# Patient Record
Sex: FEMALE | Race: ASIAN | NOT HISPANIC OR LATINO
[De-identification: names, ages, dates, MRNs, and addresses within clinical notes are randomized per-mention and may not be internally consistent; named-entity substitution may affect disease eponyms.]

---

## 2018-07-09 PROBLEM — Z00.00 ENCOUNTER FOR PREVENTIVE HEALTH EXAMINATION: Status: ACTIVE | Noted: 2018-07-09

## 2018-07-16 ENCOUNTER — APPOINTMENT (OUTPATIENT)
Dept: SURGERY | Facility: CLINIC | Age: 33
End: 2018-07-16
Payer: MEDICAID

## 2018-07-16 VITALS
SYSTOLIC BLOOD PRESSURE: 95 MMHG | HEART RATE: 74 BPM | DIASTOLIC BLOOD PRESSURE: 68 MMHG | OXYGEN SATURATION: 100 % | BODY MASS INDEX: 17.49 KG/M2 | WEIGHT: 105 LBS | TEMPERATURE: 98 F | HEIGHT: 65 IN

## 2018-07-16 PROCEDURE — 99203 OFFICE O/P NEW LOW 30 MIN: CPT

## 2018-08-08 ENCOUNTER — OUTPATIENT (OUTPATIENT)
Dept: OUTPATIENT SERVICES | Facility: HOSPITAL | Age: 33
LOS: 1 days | End: 2018-08-08
Payer: MEDICAID

## 2018-08-08 VITALS
WEIGHT: 106.04 LBS | HEART RATE: 72 BPM | HEIGHT: 65 IN | RESPIRATION RATE: 15 BRPM | SYSTOLIC BLOOD PRESSURE: 105 MMHG | TEMPERATURE: 98 F | OXYGEN SATURATION: 98 % | DIASTOLIC BLOOD PRESSURE: 65 MMHG

## 2018-08-08 DIAGNOSIS — R59.1 GENERALIZED ENLARGED LYMPH NODES: ICD-10-CM

## 2018-08-08 DIAGNOSIS — Z01.818 ENCOUNTER FOR OTHER PREPROCEDURAL EXAMINATION: ICD-10-CM

## 2018-08-08 LAB — HCG UR QL: NEGATIVE — SIGNIFICANT CHANGE UP

## 2018-08-08 PROCEDURE — 81025 URINE PREGNANCY TEST: CPT

## 2018-08-08 PROCEDURE — G0463: CPT

## 2018-08-08 NOTE — H&P PST ADULT - RS GEN PE MLT RESP DETAILS PC
breath sounds equal/clear to auscultation bilaterally/no rhonchi/no wheezes/airway patent/respirations non-labored/good air movement/no rales

## 2018-08-08 NOTE — H&P PST ADULT - NEGATIVE CARDIOVASCULAR SYMPTOMS
no chest pain/no palpitations/no dyspnea on exertion/no orthopnea/no peripheral edema/no paroxysmal nocturnal dyspnea/no claudication

## 2018-08-08 NOTE — H&P PST ADULT - NEGATIVE BREAST SYMPTOMS
no nipple discharge R/no breast tenderness L/no breast lump R/no breast tenderness R/no nipple discharge L

## 2018-08-08 NOTE — H&P PST ADULT - NEGATIVE GASTROINTESTINAL SYMPTOMS
no constipation/no change in bowel habits/no flatulence/no abdominal pain/no vomiting/no diarrhea/no nausea

## 2018-08-08 NOTE — H&P PST ADULT - GASTROINTESTINAL DETAILS
bowel sounds normal/soft/no distention/no bruit/no rebound tenderness/normal/nontender/no masses palpable/no guarding

## 2018-08-08 NOTE — H&P PST ADULT - HISTORY OF PRESENT ILLNESS
32 years old female presented with enlarged supraclavicular node on left side x 4 months, pt was diagnosed with generalized enlarged lymph nodes and is scheduled for excision of left supraclavicular lymph node biopsy on 8/10/18.

## 2018-08-08 NOTE — H&P PST ADULT - NEGATIVE GENERAL GENITOURINARY SYMPTOMS
no incontinence/no flank pain L/no urinary hesitancy/no renal colic/normal urinary frequency/no hematuria/no dysuria/no flank pain R/no urine discoloration

## 2018-08-08 NOTE — H&P PST ADULT - NSANTHOSAYNRD_GEN_A_CORE
No. SOFÍA screening performed.  STOP BANG Legend: 0-2 = LOW Risk; 3-4 = INTERMEDIATE Risk; 5-8 = HIGH Risk

## 2018-08-12 ENCOUNTER — TRANSCRIPTION ENCOUNTER (OUTPATIENT)
Age: 33
End: 2018-08-12

## 2018-08-13 ENCOUNTER — RESULT REVIEW (OUTPATIENT)
Age: 33
End: 2018-08-13

## 2018-08-13 ENCOUNTER — OUTPATIENT (OUTPATIENT)
Dept: OUTPATIENT SERVICES | Facility: HOSPITAL | Age: 33
LOS: 1 days | End: 2018-08-13
Payer: MEDICAID

## 2018-08-13 VITALS
DIASTOLIC BLOOD PRESSURE: 87 MMHG | TEMPERATURE: 97 F | HEART RATE: 94 BPM | WEIGHT: 106.04 LBS | HEIGHT: 65 IN | SYSTOLIC BLOOD PRESSURE: 114 MMHG | OXYGEN SATURATION: 99 % | RESPIRATION RATE: 14 BRPM

## 2018-08-13 VITALS
OXYGEN SATURATION: 100 % | TEMPERATURE: 98 F | SYSTOLIC BLOOD PRESSURE: 108 MMHG | RESPIRATION RATE: 18 BRPM | DIASTOLIC BLOOD PRESSURE: 69 MMHG | HEART RATE: 73 BPM

## 2018-08-13 DIAGNOSIS — R59.1 GENERALIZED ENLARGED LYMPH NODES: ICD-10-CM

## 2018-08-13 LAB
GRAM STN FLD: SIGNIFICANT CHANGE UP
SPECIMEN SOURCE: SIGNIFICANT CHANGE UP

## 2018-08-13 PROCEDURE — 87070 CULTURE OTHR SPECIMN AEROBIC: CPT

## 2018-08-13 PROCEDURE — 88341 IMHCHEM/IMCYTCHM EA ADD ANTB: CPT

## 2018-08-13 PROCEDURE — 88333 PATH CONSLTJ SURG CYTO XM 1: CPT

## 2018-08-13 PROCEDURE — 88342 IMHCHEM/IMCYTCHM 1ST ANTB: CPT

## 2018-08-13 PROCEDURE — 88365 INSITU HYBRIDIZATION (FISH): CPT | Mod: 26

## 2018-08-13 PROCEDURE — 88334 PATH CONSLTJ SURG CYTO XM EA: CPT | Mod: 26,59

## 2018-08-13 PROCEDURE — ZZZZZ: CPT

## 2018-08-13 PROCEDURE — 88331 PATH CONSLTJ SURG 1 BLK 1SPC: CPT

## 2018-08-13 PROCEDURE — 88184 FLOWCYTOMETRY/ TC 1 MARKER: CPT

## 2018-08-13 PROCEDURE — 88331 PATH CONSLTJ SURG 1 BLK 1SPC: CPT | Mod: 26

## 2018-08-13 PROCEDURE — 38510 BIOPSY/REMOVAL LYMPH NODES: CPT | Mod: LT

## 2018-08-13 PROCEDURE — 88365 INSITU HYBRIDIZATION (FISH): CPT

## 2018-08-13 PROCEDURE — 88342 IMHCHEM/IMCYTCHM 1ST ANTB: CPT | Mod: 26

## 2018-08-13 PROCEDURE — 88307 TISSUE EXAM BY PATHOLOGIST: CPT

## 2018-08-13 PROCEDURE — 87075 CULTR BACTERIA EXCEPT BLOOD: CPT

## 2018-08-13 PROCEDURE — 88185 FLOWCYTOMETRY/TC ADD-ON: CPT

## 2018-08-13 PROCEDURE — 88341 IMHCHEM/IMCYTCHM EA ADD ANTB: CPT | Mod: 26

## 2018-08-13 PROCEDURE — 88307 TISSUE EXAM BY PATHOLOGIST: CPT | Mod: 26

## 2018-08-13 RX ORDER — FENTANYL CITRATE 50 UG/ML
25 INJECTION INTRAVENOUS
Qty: 0 | Refills: 0 | Status: DISCONTINUED | OUTPATIENT
Start: 2018-08-13 | End: 2018-08-13

## 2018-08-13 RX ORDER — SODIUM CHLORIDE 9 MG/ML
1000 INJECTION, SOLUTION INTRAVENOUS
Qty: 0 | Refills: 0 | Status: DISCONTINUED | OUTPATIENT
Start: 2018-08-13 | End: 2018-08-13

## 2018-08-13 RX ORDER — KETOROLAC TROMETHAMINE 30 MG/ML
1 SYRINGE (ML) INJECTION
Qty: 20 | Refills: 0 | OUTPATIENT
Start: 2018-08-13 | End: 2018-08-17

## 2018-08-13 RX ORDER — SODIUM CHLORIDE 9 MG/ML
3 INJECTION INTRAMUSCULAR; INTRAVENOUS; SUBCUTANEOUS EVERY 8 HOURS
Qty: 0 | Refills: 0 | Status: DISCONTINUED | OUTPATIENT
Start: 2018-08-13 | End: 2018-08-13

## 2018-08-13 RX ORDER — IBUPROFEN 200 MG
600 TABLET ORAL EVERY 6 HOURS
Qty: 0 | Refills: 0 | Status: DISCONTINUED | OUTPATIENT
Start: 2018-08-13 | End: 2018-08-21

## 2018-08-13 NOTE — BRIEF OPERATIVE NOTE - PROCEDURE
<<-----Click on this checkbox to enter Procedure Excision of cervical lymph node of left side of neck  08/13/2018    Active  MAYU

## 2018-08-13 NOTE — ASU DISCHARGE PLAN (ADULT/PEDIATRIC). - MEDICATION SUMMARY - MEDICATIONS TO TAKE
I will START or STAY ON the medications listed below when I get home from the hospital:    ketorolac 10 mg oral tablet  -- 1 tab(s) by mouth 4 times a day, As Needed -for moderate pain   -- It is very important that you take or use this exactly as directed.  Do not skip doses or discontinue unless directed by your doctor.  May cause drowsiness or dizziness.  Obtain medical advice before taking any non-prescription drugs as some may affect the action of this medication.  Take with food or milk.    -- Indication: For Moderate pain

## 2018-08-15 LAB — TM INTERPRETATION: SIGNIFICANT CHANGE UP

## 2018-08-18 LAB
CULTURE RESULTS: SIGNIFICANT CHANGE UP
SPECIMEN SOURCE: SIGNIFICANT CHANGE UP

## 2018-08-23 LAB — SURGICAL PATHOLOGY FINAL REPORT - CH: SIGNIFICANT CHANGE UP

## 2018-08-24 PROBLEM — R59.1 GENERALIZED ENLARGED LYMPH NODES: Chronic | Status: ACTIVE | Noted: 2018-08-08

## 2018-08-27 ENCOUNTER — APPOINTMENT (OUTPATIENT)
Dept: SURGERY | Facility: CLINIC | Age: 33
End: 2018-08-27
Payer: COMMERCIAL

## 2018-08-27 PROCEDURE — 99024 POSTOP FOLLOW-UP VISIT: CPT

## 2018-09-10 ENCOUNTER — APPOINTMENT (OUTPATIENT)
Dept: INTERVENTIONAL RADIOLOGY/VASCULAR | Facility: HOSPITAL | Age: 33
End: 2018-09-10

## 2020-01-13 NOTE — ASU PREOP CHECKLIST - LOOSE TEETH
Referred by: Saniya Isaac MD; Medical Diagnosis (from order):           Physical Therapy -  Daily Treatment Note    Visit:  3     SUBJECTIVE                                                                                                             Got a car over the weekend. Does not take much for the left side of her neck to ache. Finds herself at work holding her shoulders elevated. Switched to holding phone on right side because it hurts to hold it on her left side. Has not been swimming at all. Exercises are going good. Electrical stimulation was good, seemed to help. Shoveled over the weekend and that may have aggravated it.    Functional Change: none    Pain / Symptoms:  Pain rating (out of 10): Current: 3     OBJECTIVE                                                                                                                          TREATMENT                                                                                                                  Therapeutic Exercise:  Upper body ergometer for 6 minutes (3 minutes forward and 3 minutes backward)    Supine over foam roller: snow angels, chest fly and alternating diagonals x15 each   Rows and pull down with red theraband x15 each   Standing cervical retractions against wall 10x5 seconds                                                  Results: decreased pain  Reaction: no adverse reaction to treatment    Skilled input: verbal instruction/cues and posture correction    Writer verbally educated and received verbal consent for hand placement, positioning of patient, and techniques to be performed today from patient for therapist position for techniques, hand placement and palpation for techniques and modality application as described above and how they are pertinent to the patient's plan of care.    Home Exercise Program: (*above indicates provided as part of home exercise program)  See above*      ASSESSMENT                                                                                                                  Warm up on Upper body ergometer for posture and endurance. Added foam roller for posture and stretching. Added resisted exercises for scapular strengthening and posture. Added cervical retractions for posture. Performed against wall for cues. Tolerated session well. Continued with electrical stimulation post-session for symptom relief. Progressed home exercise program.     Pain/symptoms after session: 2  Patient Education:   Results of above outlined education: Verbalizes understanding and Demonstrates understanding   PLAN                                                                                                                             Suggestions for next session as indicated: Progress per plan of care, work space ergonomics        Procedures and total treatment time documented Time Entry flowsheet.     no

## 2021-02-20 NOTE — ASU PATIENT PROFILE, ADULT - NS PRO PASSIVE SMOKE EXP
[phone] No

## 2021-07-23 NOTE — H&P PST ADULT - NSANTHBMIRD_ENT_A_CORE
5-Fu Counseling: 5-Fluorouracil Counseling:  I discussed with the patient the risks of 5-fluorouracil including but not limited to erythema, scaling, itching, weeping, crusting, and pain. No

## 2024-03-01 NOTE — H&P PST ADULT - PROBLEM SELECTOR PROBLEM 1
Generalized enlarged lymph nodes